# Patient Record
Sex: MALE | HISPANIC OR LATINO
[De-identification: names, ages, dates, MRNs, and addresses within clinical notes are randomized per-mention and may not be internally consistent; named-entity substitution may affect disease eponyms.]

---

## 2020-06-23 ENCOUNTER — APPOINTMENT (OUTPATIENT)
Dept: UROLOGY | Facility: CLINIC | Age: 50
End: 2020-06-23
Payer: COMMERCIAL

## 2020-06-23 VITALS
SYSTOLIC BLOOD PRESSURE: 131 MMHG | HEIGHT: 71 IN | BODY MASS INDEX: 30.38 KG/M2 | TEMPERATURE: 98.2 F | WEIGHT: 217 LBS | HEART RATE: 85 BPM | OXYGEN SATURATION: 97 % | DIASTOLIC BLOOD PRESSURE: 86 MMHG

## 2020-06-23 DIAGNOSIS — Z86.79 PERSONAL HISTORY OF OTHER DISEASES OF THE CIRCULATORY SYSTEM: ICD-10-CM

## 2020-06-23 DIAGNOSIS — R35.0 FREQUENCY OF MICTURITION: ICD-10-CM

## 2020-06-23 PROBLEM — Z00.00 ENCOUNTER FOR PREVENTIVE HEALTH EXAMINATION: Status: ACTIVE | Noted: 2020-06-23

## 2020-06-23 PROCEDURE — 99204 OFFICE O/P NEW MOD 45 MIN: CPT

## 2020-06-24 PROBLEM — Z86.79 HISTORY OF HYPERTENSION: Status: RESOLVED | Noted: 2020-06-24 | Resolved: 2020-06-24

## 2020-06-24 NOTE — END OF VISIT
[FreeTextEntry3] : Labs are sent and he will trial alpha blockade with a follow-up transrectal ultrasound of the prostate gland

## 2020-06-24 NOTE — PHYSICAL EXAM
[General Appearance - Well Developed] : well developed [General Appearance - Well Nourished] : well nourished [Normal Appearance] : normal appearance [Well Groomed] : well groomed [General Appearance - In No Acute Distress] : no acute distress [Edema] : no peripheral edema [Respiration, Rhythm And Depth] : normal respiratory rhythm and effort [Exaggerated Use Of Accessory Muscles For Inspiration] : no accessory muscle use [Abdomen Soft] : soft [Abdomen Tenderness] : non-tender [Costovertebral Angle Tenderness] : no ~M costovertebral angle tenderness [Urethral Meatus] : meatus normal [Urinary Bladder Findings] : the bladder was normal on palpation [Scrotum] : the scrotum was normal [Testes Mass (___cm)] : there were no testicular masses [No Prostate Nodules] : no prostate nodules [FreeTextEntry1] : JProstate is small and plpably benign [] : no rash [Normal Station and Gait] : the gait and station were normal for the patient's age [Affect] : the affect was normal [No Focal Deficits] : no focal deficits [Oriented To Time, Place, And Person] : oriented to person, place, and time [No Palpable Adenopathy] : no palpable adenopathy [Mood] : the mood was normal [Not Anxious] : not anxious

## 2020-06-24 NOTE — REVIEW OF SYSTEMS
[Wake up at night to urinate  How many times?  ___] : wakes up to urinate [unfilled] times during the night [Bladder fullness after urinating] : bladder fullness after urinating [Negative] : Heme/Lymph [see HPI] : see HPI

## 2020-06-24 NOTE — HISTORY OF PRESENT ILLNESS
[FreeTextEntry1] : This patient presents with lower urinary tract symptoms noted over the past few weeks.  He has no dysuria but feels that he must urinate again after voiding and also has a feeling of not completely emptying the bladder.

## 2020-07-07 ENCOUNTER — TRANSCRIPTION ENCOUNTER (OUTPATIENT)
Age: 50
End: 2020-07-07

## 2020-07-07 ENCOUNTER — APPOINTMENT (OUTPATIENT)
Dept: UROLOGY | Facility: CLINIC | Age: 50
End: 2020-07-07
Payer: COMMERCIAL

## 2020-07-07 VITALS
BODY MASS INDEX: 30.38 KG/M2 | DIASTOLIC BLOOD PRESSURE: 80 MMHG | SYSTOLIC BLOOD PRESSURE: 119 MMHG | OXYGEN SATURATION: 97 % | TEMPERATURE: 97.7 F | HEART RATE: 75 BPM | WEIGHT: 217 LBS | HEIGHT: 71 IN

## 2020-07-07 PROCEDURE — 51741 ELECTRO-UROFLOWMETRY FIRST: CPT

## 2020-07-07 PROCEDURE — 76872 US TRANSRECTAL: CPT

## 2020-07-07 PROCEDURE — 76857 US EXAM PELVIC LIMITED: CPT

## 2021-01-05 ENCOUNTER — APPOINTMENT (OUTPATIENT)
Dept: UROLOGY | Facility: CLINIC | Age: 51
End: 2021-01-05
Payer: COMMERCIAL

## 2021-01-05 VITALS
SYSTOLIC BLOOD PRESSURE: 134 MMHG | HEIGHT: 71 IN | DIASTOLIC BLOOD PRESSURE: 89 MMHG | HEART RATE: 81 BPM | WEIGHT: 210 LBS | BODY MASS INDEX: 29.4 KG/M2 | OXYGEN SATURATION: 99 %

## 2021-01-05 PROCEDURE — 51798 US URINE CAPACITY MEASURE: CPT | Mod: 59

## 2021-01-05 PROCEDURE — 99213 OFFICE O/P EST LOW 20 MIN: CPT | Mod: 25

## 2021-01-05 PROCEDURE — 99072 ADDL SUPL MATRL&STAF TM PHE: CPT

## 2021-01-05 PROCEDURE — 51741 ELECTRO-UROFLOWMETRY FIRST: CPT

## 2021-01-05 NOTE — HISTORY OF PRESENT ILLNESS
[FreeTextEntry1] : This patient is here for a checkup and has some voiding complaints.  Urinary flow study showed

## 2021-01-05 NOTE — REVIEW OF SYSTEMS
[see HPI] : see HPI [Wake up at night to urinate  How many times?  ___] : wakes up to urinate [unfilled] times during the night [Bladder fullness after urinating] : bladder fullness after urinating [Negative] : Heme/Lymph

## 2021-02-02 ENCOUNTER — APPOINTMENT (OUTPATIENT)
Dept: UROLOGY | Facility: CLINIC | Age: 51
End: 2021-02-02

## 2021-04-02 ENCOUNTER — RX RENEWAL (OUTPATIENT)
Age: 51
End: 2021-04-02

## 2022-01-02 ENCOUNTER — EMERGENCY (EMERGENCY)
Facility: HOSPITAL | Age: 52
LOS: 0 days | Discharge: HOME | End: 2022-01-02
Attending: EMERGENCY MEDICINE
Payer: COMMERCIAL

## 2022-01-02 VITALS
OXYGEN SATURATION: 99 % | RESPIRATION RATE: 18 BRPM | HEART RATE: 82 BPM | SYSTOLIC BLOOD PRESSURE: 132 MMHG | TEMPERATURE: 98 F | DIASTOLIC BLOOD PRESSURE: 92 MMHG

## 2022-01-02 VITALS — WEIGHT: 210.98 LBS | HEIGHT: 71 IN

## 2022-01-02 DIAGNOSIS — I10 ESSENTIAL (PRIMARY) HYPERTENSION: ICD-10-CM

## 2022-01-02 DIAGNOSIS — M71.22 SYNOVIAL CYST OF POPLITEAL SPACE [BAKER], LEFT KNEE: ICD-10-CM

## 2022-01-02 DIAGNOSIS — M79.662 PAIN IN LEFT LOWER LEG: ICD-10-CM

## 2022-01-02 PROCEDURE — 99284 EMERGENCY DEPT VISIT MOD MDM: CPT

## 2022-01-02 PROCEDURE — 93971 EXTREMITY STUDY: CPT | Mod: 26,LT

## 2022-01-02 PROCEDURE — 93971 EXTREMITY STUDY: CPT | Mod: LT

## 2022-01-02 PROCEDURE — 99284 EMERGENCY DEPT VISIT MOD MDM: CPT | Mod: 25

## 2022-01-02 NOTE — ED ADULT TRIAGE NOTE - CHIEF COMPLAINT QUOTE
Pt reports flu like symptoms. Able to move head freely. Pt reports getting a cramp in his calf with developing pain to his lower leg.  Pt reports edema in ankle/calf area. Denies SOB or CP.

## 2022-01-02 NOTE — ED STATDOCS - NSFOLLOWUPINSTRUCTIONS_ED_ALL_ED_FT
[facility]  	                       BAKER CYST - AfterCare(R) Instructions(ER/ED)           Quistmiah Lutz    LO QUE NECESITA SABER:    Un quiste de Rito es timoteo protuberancia abultada de líquido detrás de la rodilla. Un quiste de Rito puede desarrollarse si tiene timoteo lesión en la rodilla, o timoteo afección kimberly la osteoartritis o un trastorno del tejido conectivo. Un quiste de Rito también puede ser llamado quiste poplíteo.    INSTRUCCIONES SOBRE EL TIAN HOSPITALARIA:    Regrese a la leatha de emergencias si:  •Usted tiene dolor intenso.      •Usted tiene moretones en el tobillo debajo del quiste.      •Mc pantorrilla se ve azulada debajo del quiste.      •Mc pantorrilla o rodilla está inflamada o sangrando.      Llame a mc médico si:  •Tiene fiebre.      •El dolor no mejora con medicamentos.      •Usted tiene preguntas o inquietudes acerca de mc condición o cuidado.      Medicamentos:  •Los CHACHA,kimberly el ibuprofeno, ayudan a disminuir la inflamación, el dolor y la fiebre. Los CHACHA pueden causar sangrado estomacal o problemas renales en ciertas personas. Si usted anthony un medicamento anticoagulante, siempre pregúntele a mc médico si los CHACHA son seguros para usted. Siempre leona la etiqueta de arabella medicamento y siga las instrucciones.      •[place] renée medicamentos kimberly se le haya indicado.Consulte con mc médico si usted gladis que mc medicamento no le está ayudando o si presenta efectos secundarios. Infórmele si es alérgico a algún medicamento. Mantenga timoteo lista actualizada de los medicamentos, las vitaminas y los productos herbales que anthony. Incluya los siguientes datos de los medicamentos: cantidad, frecuencia y motivo de administración. Traiga con usted la lista o los envases de las píldoras a renée citas de seguimiento. Lleve la lista de los medicamentos con usted en ivy de timoteo emergencia.      Cuidado de mc rodilla:  •Descanse tanto kimberly sea necesario.Limite movimiento mientras mc rodilla se recupera. [place] ayudará a disminuir el riesgo de hacer más daño a mc rodilla. Puede que usted necesite utilizar muletas para quitar peso de la rodilla lesionada. Use las muletas kimberly se le indica.      •Póngale hielo a mc rodilla.El hielo ayuda a disminuir la inflamación y el dolor. Use timoteo compresa de hielo o ponga hielo en timoteo bolsa plástica. Envuelva el hielo con timoteo toalla antes de colocarlo sobre mc piel. Aplique el hielo sobre la rodilla de 15 a 20 minutos, de 3 a 4 veces al día. Jason esto por 2 a 3 días.      •Apoye mc rodilla.Envuélvala con timoteo venda elástica para darle apoyo. Pregúntele a mc médico si usted necesita timoteo abrazadera para más apoyo. [place] ayudará a disminuir la hinchazón y el movimiento para que mc rodilla se sane.      •Eleve mc rodilla.Use almohadas para levantar mc rodilla sobre el nivel de mc corazón tan frecuentemente kimberly pueda. [place] ayudará a disminuir la hinchazón. No coloque la almohada pepito debajo de mc rodilla. Colóquela debajo de la pantorrilla en mc lugar.  Elevar la pierna           •Vaya a terapia física según indicaciones.Un fisioterapeuta le puede enseñar ejercicios para ayudarle a mejorar el movimiento y la fuerza, y para disminuir el dolor.      Acuda a la consulta de control con mc médico según las indicaciones:Anote renée preguntas para que se acuerde de hacerlas jonatahn renée visitas.       © Copyright [employer] 2022           back to top                          © Copyright [employer] 2022

## 2022-01-02 NOTE — ED STATDOCS - OBJECTIVE STATEMENT
50 y/o M with a PMHx of HTN presents to the ED c/o muscle cramp to L calf  x 2 weeks. Denies hx of similar symptoms. c/o L calf pain here. No CP or SOB.

## 2022-01-02 NOTE — ED STATDOCS - NS_EDPROVIDERDISPOUSERTYPE_ED_A_ED
When Was Your Biopsy?: 1/23/2017 Scribe Attestation (For Scribes USE Only)... Attending Attestation (For Attendings USE Only).../Scribe Attestation (For Scribes USE Only)...

## 2022-01-02 NOTE — ED STATDOCS - PATIENT PORTAL LINK FT
You can access the FollowMyHealth Patient Portal offered by [facility] by registering at the following website: [email or web address]. By joining [facility]’s FollowMyHealth portal, you will also be able to view your health information using other applications (apps) compatible with our system.

## 2022-01-02 NOTE — ED STATDOCS - PROGRESS NOTE DETAILS
Used Pac  [id number] to explain findings of Veras Cyst on US and recommend f/u with Ortho.  Anum Palacio PA-C

## 2022-04-13 ENCOUNTER — NON-APPOINTMENT (OUTPATIENT)
Age: 52
End: 2022-04-13

## 2022-04-14 ENCOUNTER — APPOINTMENT (OUTPATIENT)
Dept: UROLOGY | Facility: CLINIC | Age: 52
End: 2022-04-14
Payer: COMMERCIAL

## 2022-04-14 VITALS
HEART RATE: 77 BPM | SYSTOLIC BLOOD PRESSURE: 144 MMHG | DIASTOLIC BLOOD PRESSURE: 84 MMHG | OXYGEN SATURATION: 96 % | HEIGHT: 71 IN | BODY MASS INDEX: 30.24 KG/M2 | WEIGHT: 216 LBS

## 2022-04-14 PROBLEM — I10 ESSENTIAL (PRIMARY) HYPERTENSION: Chronic | Status: ACTIVE | Noted: 2022-01-02

## 2022-04-14 PROCEDURE — 99213 OFFICE O/P EST LOW 20 MIN: CPT

## 2022-04-14 NOTE — END OF VISIT
[FreeTextEntry3] : Labs ordered and his prescription was refilled.  A duplex scan of the penis was ordered as well

## 2022-04-14 NOTE — REVIEW OF SYSTEMS
[Wake up at night to urinate  How many times?  ___] : wakes up to urinate [unfilled] times during the night [see HPI] : see HPI [Bladder fullness after urinating] : bladder fullness after urinating [Negative] : Heme/Lymph

## 2022-04-14 NOTE — PHYSICAL EXAM
[General Appearance - Well Developed] : well developed [General Appearance - Well Nourished] : well nourished [Normal Appearance] : normal appearance [Well Groomed] : well groomed [General Appearance - In No Acute Distress] : no acute distress [Abdomen Soft] : soft [Abdomen Tenderness] : non-tender [Costovertebral Angle Tenderness] : no ~M costovertebral angle tenderness [Urinary Bladder Findings] : the bladder was normal on palpation [Urethral Meatus] : meatus normal [Scrotum] : the scrotum was normal [Testes Mass (___cm)] : there were no testicular masses [No Prostate Nodules] : no prostate nodules [FreeTextEntry1] : JProstate is small and plpably benign [Edema] : no peripheral edema [] : no respiratory distress [Exaggerated Use Of Accessory Muscles For Inspiration] : no accessory muscle use [Respiration, Rhythm And Depth] : normal respiratory rhythm and effort [Oriented To Time, Place, And Person] : oriented to person, place, and time [Affect] : the affect was normal [Mood] : the mood was normal [Not Anxious] : not anxious [Normal Station and Gait] : the gait and station were normal for the patient's age [No Focal Deficits] : no focal deficits [No Palpable Adenopathy] : no palpable adenopathy

## 2022-04-14 NOTE — HISTORY OF PRESENT ILLNESS
[FreeTextEntry1] : This patient presents for a routine follow-up.  He had run out of tamsulosin 2 weeks ago and has noted the effects of this.  With.  He has noted decrease in stream and frequency.  He also notes erectile dysfunction which has developed recently

## 2022-04-15 LAB
APPEARANCE: CLEAR
BACTERIA: NEGATIVE
BILIRUBIN URINE: NEGATIVE
BLOOD URINE: NEGATIVE
COLOR: YELLOW
GLUCOSE QUALITATIVE U: NEGATIVE
HYALINE CASTS: 1 /LPF
KETONES URINE: NEGATIVE
LEUKOCYTE ESTERASE URINE: NEGATIVE
MICROSCOPIC-UA: NORMAL
NITRITE URINE: NEGATIVE
PH URINE: 5.5
PROTEIN URINE: NORMAL
PSA SERPL-MCNC: 0.71 NG/ML
RED BLOOD CELLS URINE: 0 /HPF
SPECIFIC GRAVITY URINE: 1.03
SQUAMOUS EPITHELIAL CELLS: 0 /HPF
TESTOST SERPL-MCNC: 606 NG/DL
URINE CYTOLOGY: NORMAL
UROBILINOGEN URINE: NORMAL
WHITE BLOOD CELLS URINE: 1 /HPF

## 2022-05-03 ENCOUNTER — APPOINTMENT (OUTPATIENT)
Dept: UROLOGY | Facility: CLINIC | Age: 52
End: 2022-05-03
Payer: COMMERCIAL

## 2022-05-03 VITALS
DIASTOLIC BLOOD PRESSURE: 89 MMHG | HEIGHT: 71 IN | WEIGHT: 216 LBS | BODY MASS INDEX: 30.24 KG/M2 | HEART RATE: 81 BPM | OXYGEN SATURATION: 98 % | SYSTOLIC BLOOD PRESSURE: 136 MMHG

## 2022-05-03 PROCEDURE — J3490T: CUSTOM | Mod: NC

## 2022-05-03 PROCEDURE — 54235 NJX CORPORA CAVERNOSA RX AGT: CPT

## 2022-05-03 PROCEDURE — 93980 PENILE VASCULAR STUDY: CPT

## 2023-02-23 ENCOUNTER — NON-APPOINTMENT (OUTPATIENT)
Age: 53
End: 2023-02-23

## 2023-02-24 ENCOUNTER — APPOINTMENT (OUTPATIENT)
Dept: UROLOGY | Facility: CLINIC | Age: 53
End: 2023-02-24
Payer: COMMERCIAL

## 2023-02-24 VITALS
RESPIRATION RATE: 14 BRPM | HEART RATE: 74 BPM | WEIGHT: 221 LBS | HEIGHT: 66 IN | DIASTOLIC BLOOD PRESSURE: 86 MMHG | SYSTOLIC BLOOD PRESSURE: 125 MMHG | BODY MASS INDEX: 35.52 KG/M2 | OXYGEN SATURATION: 97 % | TEMPERATURE: 97.8 F

## 2023-02-24 DIAGNOSIS — N42.81 PROSTATODYNIA SYNDROME: ICD-10-CM

## 2023-02-24 DIAGNOSIS — N13.8 BENIGN PROSTATIC HYPERPLASIA WITH LOWER URINARY TRACT SYMPMS: ICD-10-CM

## 2023-02-24 DIAGNOSIS — R10.9 UNSPECIFIED ABDOMINAL PAIN: ICD-10-CM

## 2023-02-24 DIAGNOSIS — N40.1 BENIGN PROSTATIC HYPERPLASIA WITH LOWER URINARY TRACT SYMPMS: ICD-10-CM

## 2023-02-24 DIAGNOSIS — M54.9 DORSALGIA, UNSPECIFIED: ICD-10-CM

## 2023-02-24 PROCEDURE — 99214 OFFICE O/P EST MOD 30 MIN: CPT

## 2023-02-24 RX ORDER — SULFAMETHOXAZOLE AND TRIMETHOPRIM 800; 160 MG/1; MG/1
800-160 TABLET ORAL TWICE DAILY
Qty: 20 | Refills: 0 | Status: ACTIVE | COMMUNITY
Start: 2023-02-24 | End: 1900-01-01

## 2023-02-24 RX ORDER — SILDENAFIL 100 MG/1
100 TABLET, FILM COATED ORAL
Qty: 10 | Refills: 11 | Status: ACTIVE | COMMUNITY
Start: 2023-02-24 | End: 1900-01-01

## 2023-02-25 LAB
APPEARANCE: CLEAR
BACTERIA: NEGATIVE
BILIRUBIN URINE: NEGATIVE
BLOOD URINE: NEGATIVE
CALCIUM OXALATE CRYSTALS: ABNORMAL
COLOR: NORMAL
GLUCOSE QUALITATIVE U: NEGATIVE
HYALINE CASTS: 0 /LPF
KETONES URINE: NEGATIVE
LEUKOCYTE ESTERASE URINE: NEGATIVE
MICROSCOPIC-UA: NORMAL
NITRITE URINE: NEGATIVE
PH URINE: 6
PROTEIN URINE: NORMAL
PSA SERPL-MCNC: 0.7 NG/ML
RED BLOOD CELLS URINE: 1 /HPF
SPECIFIC GRAVITY URINE: 1.03
SQUAMOUS EPITHELIAL CELLS: 0 /HPF
TESTOST SERPL-MCNC: 639 NG/DL
UROBILINOGEN URINE: NORMAL
WHITE BLOOD CELLS URINE: 0 /HPF

## 2023-02-27 LAB — URINE CYTOLOGY: NORMAL

## 2023-03-04 NOTE — HISTORY OF PRESENT ILLNESS
[FreeTextEntry1] : Chief Complaint: Patient notes right back and flank pain, erectile dysfunction, and pain with ejaculation and increase in lower tract symptoms. \par The above complaint have developed over the past few months.\par

## 2023-03-04 NOTE — PHYSICAL EXAM
[General Appearance - Well Developed] : well developed [General Appearance - Well Nourished] : well nourished [Normal Appearance] : normal appearance [Well Groomed] : well groomed [General Appearance - In No Acute Distress] : no acute distress [Abdomen Soft] : soft [Abdomen Tenderness] : non-tender [Costovertebral Angle Tenderness] : no ~M costovertebral angle tenderness [Urethral Meatus] : meatus normal [Urinary Bladder Findings] : the bladder was normal on palpation [Scrotum] : the scrotum was normal [Testes Mass (___cm)] : there were no testicular masses [No Prostate Nodules] : no prostate nodules [Edema] : no peripheral edema [] : no respiratory distress [Respiration, Rhythm And Depth] : normal respiratory rhythm and effort [Exaggerated Use Of Accessory Muscles For Inspiration] : no accessory muscle use [Oriented To Time, Place, And Person] : oriented to person, place, and time [Affect] : the affect was normal [Mood] : the mood was normal [Not Anxious] : not anxious [Normal Station and Gait] : the gait and station were normal for the patient's age [No Focal Deficits] : no focal deficits [No Palpable Adenopathy] : no palpable adenopathy [FreeTextEntry1] : Normal male genitalia is noted. The prostate is small to moderate in size. Palpably benign.

## 2023-03-04 NOTE — END OF VISIT
[FreeTextEntry3] : PSA, urine and cytology culture are sent. He will have a sonogram of the kidneys and bladder and a lumbosacral spine X-ray as well. He will follow-up here with a trans-rectal ultrasound and a prostate gland with a duplex scan of the penis. Provider increased his Tamsulosin two hours prior to sleep and called in a 10-day course of Bactrim DS. He will try Sildenafil 100 mg with precautions and instructions with further plans to follow

## 2023-03-04 NOTE — ASSESSMENT
[FreeTextEntry1] : Impression: Back and flank pain, pain on ejaculation and pain in prostate area, pelvic discomfort, change in voiding symptoms, and erectile dysfunction.

## 2023-03-04 NOTE — ADDENDUM
[FreeTextEntry1] : I, Massimo Nguyen, acted as a scribe on behalf of Dr. Saud Meyers 2/24/2023. All medical entries made by the scribe were at my, Dr. Saud Meyers's, direction and personally dictated by me on 2/24/2023. I have reviewed the chart and agree that the record accurately reflects my personal performance of the history, physical exam, assessment and plan. I have personally directed, reviewed, and agreed with the chat.\par

## 2023-03-20 ENCOUNTER — APPOINTMENT (OUTPATIENT)
Dept: UROLOGY | Facility: CLINIC | Age: 53
End: 2023-03-20
Payer: COMMERCIAL

## 2023-03-20 VITALS
DIASTOLIC BLOOD PRESSURE: 76 MMHG | HEIGHT: 71 IN | HEART RATE: 75 BPM | RESPIRATION RATE: 14 BRPM | WEIGHT: 218 LBS | SYSTOLIC BLOOD PRESSURE: 123 MMHG | TEMPERATURE: 97.5 F | OXYGEN SATURATION: 97 % | BODY MASS INDEX: 30.52 KG/M2

## 2023-03-20 DIAGNOSIS — R33.9 RETENTION OF URINE, UNSPECIFIED: ICD-10-CM

## 2023-03-20 PROCEDURE — 76872 US TRANSRECTAL: CPT

## 2023-03-20 PROCEDURE — 51741 ELECTRO-UROFLOWMETRY FIRST: CPT

## 2023-03-20 PROCEDURE — 76857 US EXAM PELVIC LIMITED: CPT

## 2023-03-28 ENCOUNTER — APPOINTMENT (OUTPATIENT)
Dept: UROLOGY | Facility: CLINIC | Age: 53
End: 2023-03-28
Payer: COMMERCIAL

## 2023-03-28 VITALS
DIASTOLIC BLOOD PRESSURE: 81 MMHG | BODY MASS INDEX: 29.54 KG/M2 | HEART RATE: 73 BPM | TEMPERATURE: 97.6 F | WEIGHT: 211 LBS | RESPIRATION RATE: 14 BRPM | HEIGHT: 71 IN | SYSTOLIC BLOOD PRESSURE: 123 MMHG | OXYGEN SATURATION: 98 %

## 2023-03-28 DIAGNOSIS — N52.9 MALE ERECTILE DYSFUNCTION, UNSPECIFIED: ICD-10-CM

## 2023-03-28 PROCEDURE — 54235 NJX CORPORA CAVERNOSA RX AGT: CPT

## 2023-03-28 PROCEDURE — 93980 PENILE VASCULAR STUDY: CPT

## 2023-03-28 RX ORDER — SILDENAFIL 20 MG/1
20 TABLET ORAL
Qty: 90 | Refills: 11 | Status: ACTIVE | COMMUNITY
Start: 2023-03-28 | End: 1900-01-01

## 2023-04-12 ENCOUNTER — RX CHANGE (OUTPATIENT)
Age: 53
End: 2023-04-12

## 2023-04-12 RX ORDER — BETHANECHOL CHLORIDE 50 MG/1
50 TABLET ORAL
Qty: 180 | Refills: 2 | Status: ACTIVE | COMMUNITY
Start: 2023-04-12 | End: 1900-01-01

## 2023-04-12 RX ORDER — BETHANECHOL CHLORIDE 50 MG/1
50 TABLET ORAL
Qty: 60 | Refills: 5 | Status: DISCONTINUED | COMMUNITY
Start: 2023-03-20 | End: 2023-04-12

## 2023-05-01 ENCOUNTER — RX CHANGE (OUTPATIENT)
Age: 53
End: 2023-05-01

## 2023-05-01 RX ORDER — CEFUROXIME AXETIL 500 MG/1
500 TABLET ORAL
Qty: 20 | Refills: 0 | Status: COMPLETED | COMMUNITY
Start: 2020-06-23 | End: 2023-05-01

## 2023-05-01 RX ORDER — TAMSULOSIN HYDROCHLORIDE 0.4 MG/1
0.4 CAPSULE ORAL
Qty: 180 | Refills: 2 | Status: ACTIVE | COMMUNITY
Start: 2023-02-24 | End: 1900-01-01

## 2023-05-01 RX ORDER — TAMSULOSIN HYDROCHLORIDE 0.4 MG/1
0.4 CAPSULE ORAL
Qty: 90 | Refills: 3 | Status: COMPLETED | COMMUNITY
Start: 2020-07-07 | End: 2023-05-01

## 2023-05-01 RX ORDER — TAMSULOSIN HYDROCHLORIDE 0.4 MG/1
0.4 CAPSULE ORAL
Qty: 89 | Refills: 3 | Status: COMPLETED | COMMUNITY
Start: 2021-01-05 | End: 2023-05-01